# Patient Record
Sex: MALE | Race: WHITE | NOT HISPANIC OR LATINO | URBAN - METROPOLITAN AREA
[De-identification: names, ages, dates, MRNs, and addresses within clinical notes are randomized per-mention and may not be internally consistent; named-entity substitution may affect disease eponyms.]

---

## 2023-12-08 ENCOUNTER — LAB OUTSIDE AN ENCOUNTER (OUTPATIENT)
Dept: URBAN - METROPOLITAN AREA CLINIC 50 | Facility: CLINIC | Age: 73
End: 2023-12-08

## 2023-12-08 ENCOUNTER — OFFICE VISIT (OUTPATIENT)
Dept: URBAN - METROPOLITAN AREA CLINIC 50 | Facility: CLINIC | Age: 73
End: 2023-12-08
Payer: MEDICARE

## 2023-12-08 VITALS
HEIGHT: 76 IN | HEART RATE: 69 BPM | DIASTOLIC BLOOD PRESSURE: 69 MMHG | WEIGHT: 240 LBS | SYSTOLIC BLOOD PRESSURE: 145 MMHG | TEMPERATURE: 98.4 F | BODY MASS INDEX: 29.22 KG/M2

## 2023-12-08 DIAGNOSIS — R19.7 DIARRHEA, UNSPECIFIED TYPE: ICD-10-CM

## 2023-12-08 DIAGNOSIS — D36.9 TUBULAR ADENOMA: ICD-10-CM

## 2023-12-08 DIAGNOSIS — K30 INDIGESTION: ICD-10-CM

## 2023-12-08 DIAGNOSIS — R13.10 DYSPHAGIA, UNSPECIFIED TYPE: ICD-10-CM

## 2023-12-08 DIAGNOSIS — R19.4 CHANGE IN BOWEL HABITS: ICD-10-CM

## 2023-12-08 PROBLEM — 88111009: Status: ACTIVE | Noted: 2023-12-08

## 2023-12-08 PROBLEM — 40739000: Status: ACTIVE | Noted: 2023-12-08

## 2023-12-08 PROBLEM — 62315008: Status: ACTIVE | Noted: 2023-12-08

## 2023-12-08 PROBLEM — 162031009: Status: ACTIVE | Noted: 2023-12-08

## 2023-12-08 PROBLEM — 444408007: Status: ACTIVE | Noted: 2023-12-08

## 2023-12-08 PROCEDURE — 99214 OFFICE O/P EST MOD 30 MIN: CPT | Performed by: PHYSICIAN ASSISTANT

## 2023-12-08 RX ORDER — DICYCLOMINE HYDROCHLORIDE 20 MG/1
1 TABLET TABLET ORAL THREE TIMES A DAY
Qty: 90 | OUTPATIENT
Start: 2023-12-08 | End: 2024-01-07

## 2023-12-08 RX ORDER — OMEPRAZOLE 40 MG/1
1 CAPSULE 30 MINUTES BEFORE MORNING MEAL CAPSULE, DELAYED RELEASE ORAL ONCE A DAY
Qty: 30 | Refills: 1 | OUTPATIENT
Start: 2023-12-08

## 2023-12-08 RX ORDER — SODIUM, POTASSIUM,MAG SULFATES 17.5-3.13G
177ML SOLUTION, RECONSTITUTED, ORAL ORAL
Qty: 1 | Refills: 0 | OUTPATIENT
Start: 2023-12-08 | End: 2023-12-09

## 2023-12-08 NOTE — HPI-TODAY'S VISIT:
Continue use Tylenol for any pain.  Follow-up with your primary doctor in physical therapy for further management of pain and fall preventions.  Return to emergency department for any new or worsening symptoms.   Patient is 74 y/o M of Dr. Mary Guerra here w concerns on change in bowel habits States getting episodes of loose stools/diarrhea on and off x several weeks, cycling w constipation Tried dulcolax, may have worsened symptoms at one point Does admit spouse had a viral GI illness in same time interval, but his sx continues coming and going BMs are usually once daily, no blood/mucus/melena When getting diarrhea episodes though with increased urgency though, 5-6x/day on days its occurring, no blood/mucus, lasting 1-2 days on and off Some abd discomfort w sx, cannot attest abd pain  No heartburn, no reflux, admits indigestion, increased hiccups in past few weeks States also concerned worsening swallowing issues, trying to eat slower to help However noticing choking on large pieces of bread or meat more frequently States stomach is having increased borborygmi as well No abnormal weight loss, no change in appetite States last labs several months ago were normal, no anemia hx per pt report, not available for review today No lung, kidney disease, does note now has cardiologist but not on blood thinners, can do stairs without issue

## 2023-12-22 ENCOUNTER — TELEPHONE ENCOUNTER (OUTPATIENT)
Dept: URBAN - METROPOLITAN AREA CLINIC 115 | Facility: CLINIC | Age: 73
End: 2023-12-22

## 2024-02-01 ENCOUNTER — OV EP (OUTPATIENT)
Dept: URBAN - METROPOLITAN AREA CLINIC 50 | Facility: CLINIC | Age: 74
End: 2024-02-01
Payer: MEDICARE

## 2024-02-01 VITALS
DIASTOLIC BLOOD PRESSURE: 79 MMHG | WEIGHT: 240 LBS | HEIGHT: 76 IN | SYSTOLIC BLOOD PRESSURE: 138 MMHG | BODY MASS INDEX: 29.22 KG/M2 | HEART RATE: 75 BPM | TEMPERATURE: 97.5 F

## 2024-02-01 DIAGNOSIS — K30 INDIGESTION: ICD-10-CM

## 2024-02-01 DIAGNOSIS — A08.11 NOROVIRUS: ICD-10-CM

## 2024-02-01 DIAGNOSIS — D36.9 TUBULAR ADENOMA: ICD-10-CM

## 2024-02-01 DIAGNOSIS — E66.3 OVERWEIGHT (BMI 25.0-29.9): ICD-10-CM

## 2024-02-01 DIAGNOSIS — R15.1 FECAL SMEARING: ICD-10-CM

## 2024-02-01 DIAGNOSIS — R19.8 BORBORYGMI: ICD-10-CM

## 2024-02-01 DIAGNOSIS — Z86.010 PERSONAL HISTORY OF COLONIC POLYPS: ICD-10-CM

## 2024-02-01 PROBLEM — 428283002: Status: ACTIVE | Noted: 2024-02-01

## 2024-02-01 PROCEDURE — 99213 OFFICE O/P EST LOW 20 MIN: CPT | Performed by: INTERNAL MEDICINE

## 2024-02-01 NOTE — HPI-TODAY'S VISIT:
73 y.o. WM Seen in Dec by ROSALIND Hinkle -- had pos Norovirus  Started in Fall - had constipation -- took Dulcolax and got diarrhea for a few days   Been on Omeprazole - all of a sudden getting hiccups Lots of gurgling - like a lion in stomach No pain No cramping  Bowels regular now Has to help them along in the AM  Slight difficulty swallowing - minor - been going on a while - wonders if related to minor stroke 10+ yrs ago Took Omeprazole for 10 days - curtailed what was going on -   Guess time for colonoscopy  Syx improved from Dec  Having leakage Thinks may be hemorrhoids  UTD w/ physical w/ PCP Mary Guerra - reports labs WNL

## 2024-02-14 ENCOUNTER — LAB (OUTPATIENT)
Dept: URBAN - METROPOLITAN AREA CLINIC 4 | Facility: CLINIC | Age: 74
End: 2024-02-14
Payer: MEDICARE

## 2024-02-14 ENCOUNTER — COL/EGD (OUTPATIENT)
Dept: URBAN - METROPOLITAN AREA SURGERY CENTER 18 | Facility: SURGERY CENTER | Age: 74
End: 2024-02-14
Payer: MEDICARE

## 2024-02-14 DIAGNOSIS — D12.2 ADENOMA OF ASCENDING COLON: ICD-10-CM

## 2024-02-14 DIAGNOSIS — K31.89 OTHER DISEASES OF STOMACH AND DUODENUM: ICD-10-CM

## 2024-02-14 DIAGNOSIS — Z86.010 ADENOMAS PERSONAL HISTORY OF COLONIC POLYPS: ICD-10-CM

## 2024-02-14 DIAGNOSIS — K31.7 BENIGN GASTRIC POLYP: ICD-10-CM

## 2024-02-14 DIAGNOSIS — D12.3 ADENOMA OF TRANSVERSE COLON: ICD-10-CM

## 2024-02-14 DIAGNOSIS — K63.5 BENIGN COLON POLYP: ICD-10-CM

## 2024-02-14 DIAGNOSIS — D12.4 ADENOMA OF DESCENDING COLON: ICD-10-CM

## 2024-02-14 DIAGNOSIS — K29.60 ADENOPAPILLOMATOSIS GASTRICA: ICD-10-CM

## 2024-02-14 PROCEDURE — 88341 IMHCHEM/IMCYTCHM EA ADD ANTB: CPT | Performed by: PATHOLOGY

## 2024-02-14 PROCEDURE — G8907 PT DOC NO EVENTS ON DISCHARG: HCPCS | Performed by: INTERNAL MEDICINE

## 2024-02-14 PROCEDURE — 88342 IMHCHEM/IMCYTCHM 1ST ANTB: CPT | Performed by: PATHOLOGY

## 2024-02-14 PROCEDURE — 45380 COLONOSCOPY AND BIOPSY: CPT | Performed by: INTERNAL MEDICINE

## 2024-02-14 PROCEDURE — 45385 COLONOSCOPY W/LESION REMOVAL: CPT | Performed by: INTERNAL MEDICINE

## 2024-02-14 PROCEDURE — 88305 TISSUE EXAM BY PATHOLOGIST: CPT | Performed by: PATHOLOGY

## 2024-02-14 PROCEDURE — 43239 EGD BIOPSY SINGLE/MULTIPLE: CPT | Performed by: INTERNAL MEDICINE
